# Patient Record
Sex: FEMALE | URBAN - METROPOLITAN AREA
[De-identification: names, ages, dates, MRNs, and addresses within clinical notes are randomized per-mention and may not be internally consistent; named-entity substitution may affect disease eponyms.]

---

## 2019-06-12 ENCOUNTER — APPOINTMENT (RX ONLY)
Dept: URBAN - METROPOLITAN AREA CLINIC 12 | Facility: CLINIC | Age: 54
Setting detail: DERMATOLOGY
End: 2019-06-12

## 2019-06-12 DIAGNOSIS — Z41.1 ENCOUNTER FOR COSMETIC SURGERY: ICD-10-CM

## 2019-06-12 PROCEDURE — ? CONSULTATION - AGING FACE

## 2019-06-12 PROCEDURE — ? PATIENT SPECIFIC COUNSELING

## 2019-06-12 NOTE — PROCEDURE: CONSULTATION - AGING FACE
Detail Level: Detailed
Send Procedure Quote As Charge: No
Consultation Charge $ (Use Numbers Only, No Text Please.): 100

## 2019-06-12 NOTE — PROCEDURE: PATIENT SPECIFIC COUNSELING
Detail Level: Simple
Other (Free Text): Patient presents for facelift consultation and didn’t want to take pictures today. Patient states she does not have a dry eye problem. Patient was open to Dr. Oseguera giving his opinion on options she thought was best for her. \\n\\nPsiobhan has hooded/crowded brows with a tired appearance and Dr. Oseguera recommended a temporal brow lift, upper blepharoplasty and periorbital chemical peel. For her lower face laxity Dr. Oseguera recommended a lower face and neck lift, along with a sub mental incision to tighten the neck muscle. Discussed where the scars will be and that typically only the  might notice. A lower neck and face lift tends to last 8-10 years and is the most aggressive option she has. \\nFor recovery she was advised that it will take 10-12 days before she can out and about without people wondering what she had done. Advised she would need to spend at least one night in the hospital and she would need a responsible adult at home to help her around the house. \\n\\nPsiobhan smokes ecigs and goes through 1 pod every 2-3 days. Advised that of Dr. Oseguera is going to do surgery on her he wants her to stop 2 weeks before and 2 weeks after.\\n\\nA step down from the neck and face lift would be to do lower face Ulthera. Advised that it tends to work 70% of the time and the results are not guaranteed. If the patient is okay with surgery, the more aggressive option is recommended.\\n\\nQuote given.

## 2019-06-27 ENCOUNTER — APPOINTMENT (RX ONLY)
Dept: URBAN - METROPOLITAN AREA CLINIC 12 | Facility: CLINIC | Age: 54
Setting detail: DERMATOLOGY
End: 2019-06-27

## 2019-06-27 DIAGNOSIS — L90.8 OTHER ATROPHIC DISORDERS OF SKIN: ICD-10-CM

## 2019-06-27 PROCEDURE — ? ULTHERAPY

## 2019-06-27 ASSESSMENT — LOCATION DETAILED DESCRIPTION DERM
LOCATION DETAILED: LEFT MEDIAL MALAR CHEEK
LOCATION DETAILED: RIGHT INFERIOR MEDIAL MALAR CHEEK
LOCATION DETAILED: LEFT SUBMANDIBULAR AREA
LOCATION DETAILED: LEFT CENTRAL BUCCAL CHEEK
LOCATION DETAILED: SUBMENTAL CHIN
LOCATION DETAILED: LEFT INFERIOR TEMPLE
LOCATION DETAILED: LEFT SUPERIOR ANTERIOR NECK
LOCATION DETAILED: LEFT INFERIOR LATERAL MALAR CHEEK
LOCATION DETAILED: RIGHT INFERIOR FOREHEAD
LOCATION DETAILED: RIGHT CENTRAL MANDIBULAR CHEEK
LOCATION DETAILED: RIGHT SUPERIOR LATERAL NECK
LOCATION DETAILED: RIGHT CHIN
LOCATION DETAILED: RIGHT SUBMANDIBULAR AREA
LOCATION DETAILED: LEFT SUPERIOR LATERAL NECK
LOCATION DETAILED: LEFT SUPERIOR LATERAL MALAR CHEEK
LOCATION DETAILED: RIGHT INFERIOR TEMPLE
LOCATION DETAILED: LEFT INFERIOR FOREHEAD
LOCATION DETAILED: RIGHT SUPERIOR LATERAL MALAR CHEEK
LOCATION DETAILED: RIGHT SUPERIOR ANTERIOR NECK
LOCATION DETAILED: LEFT FOREHEAD
LOCATION DETAILED: RIGHT CENTRAL MALAR CHEEK

## 2019-06-27 ASSESSMENT — LOCATION SIMPLE DESCRIPTION DERM
LOCATION SIMPLE: LEFT TEMPLE
LOCATION SIMPLE: LEFT FOREHEAD
LOCATION SIMPLE: RIGHT SUBMANDIBULAR AREA
LOCATION SIMPLE: LEFT CHEEK
LOCATION SIMPLE: RIGHT CHEEK
LOCATION SIMPLE: RIGHT ANTERIOR NECK
LOCATION SIMPLE: CHIN
LOCATION SIMPLE: LEFT SUBMANDIBULAR AREA
LOCATION SIMPLE: SUBMENTAL CHIN
LOCATION SIMPLE: RIGHT FOREHEAD
LOCATION SIMPLE: RIGHT TEMPLE
LOCATION SIMPLE: LEFT ANTERIOR NECK

## 2019-06-27 ASSESSMENT — LOCATION ZONE DERM
LOCATION ZONE: NECK
LOCATION ZONE: FACE

## 2019-06-27 NOTE — PROCEDURE: ULTHERAPY
Patient Reported Discomfort: 0
Post-Procedures Photographs: No
Consent: Written consent obtained, risks reviewed including but not limited to crusting, scabbing, blistering, scarring, darker or lighter pigmentary change, and/or incomplete improvement.
Distraction Techniques: Buzzy Bee
Anesthesia Volume In Cc: 6
Pre-Procedures Photographs: Yes
Detail Level: Simple
Anesthesia Type: 1% lidocaine with epinephrine
Price $ (Use Numbers Only, No Text Please.): 5942
Total Lines (Use Numbers Only, No Text Please.): 441
Treatment Number: 1
Postcare Instructions: I reviewed with the patient in detail post-care instructions. Patient should stay away from the sun and wear sun protection until treated areas are fully healed.

## 2019-09-10 ENCOUNTER — APPOINTMENT (RX ONLY)
Dept: URBAN - METROPOLITAN AREA CLINIC 12 | Facility: CLINIC | Age: 54
Setting detail: DERMATOLOGY
End: 2019-09-10

## 2019-09-10 DIAGNOSIS — Z41.9 ENCOUNTER FOR PROCEDURE FOR PURPOSES OTHER THAN REMEDYING HEALTH STATE, UNSPECIFIED: ICD-10-CM

## 2019-09-10 DIAGNOSIS — L90.8 OTHER ATROPHIC DISORDERS OF SKIN: ICD-10-CM

## 2019-09-10 PROCEDURE — ? PHOTOS OBTAINED

## 2019-09-10 PROCEDURE — ? FILLERS

## 2019-09-10 PROCEDURE — ? PATIENT SPECIFIC COUNSELING

## 2019-09-10 ASSESSMENT — LOCATION SIMPLE DESCRIPTION DERM
LOCATION SIMPLE: LEFT LIP
LOCATION SIMPLE: NOSE
LOCATION SIMPLE: CHIN
LOCATION SIMPLE: LEFT FOREHEAD
LOCATION SIMPLE: LEFT CHEEK
LOCATION SIMPLE: RIGHT CHEEK

## 2019-09-10 ASSESSMENT — LOCATION ZONE DERM
LOCATION ZONE: LIP
LOCATION ZONE: NOSE
LOCATION ZONE: FACE

## 2019-09-10 ASSESSMENT — LOCATION DETAILED DESCRIPTION DERM
LOCATION DETAILED: RIGHT INFERIOR CENTRAL MALAR CHEEK
LOCATION DETAILED: LEFT INFERIOR FOREHEAD
LOCATION DETAILED: NASAL TIP
LOCATION DETAILED: LEFT LOWER CUTANEOUS LIP
LOCATION DETAILED: LEFT CENTRAL MALAR CHEEK
LOCATION DETAILED: LEFT CHIN
LOCATION DETAILED: RIGHT CENTRAL MALAR CHEEK
LOCATION DETAILED: LEFT INFERIOR CENTRAL MALAR CHEEK
LOCATION DETAILED: LEFT INFERIOR MEDIAL FOREHEAD

## 2019-09-10 NOTE — PROCEDURE: FILLERS
Cheeks Filler Volume In Cc: 0
Show Price In Note?: no
Price $ (Numeric Only, No Text Please.): 968
Jawline Filler Volume In Cc: 1
Consent: Written consent obtained. Risks include but not limited to bruising, beading, irregular texture, ulceration, infection, allergic reaction, scar formation, incomplete augmentation, temporary nature, procedural pain.
Filler: Christelle Wakefield ([place]-L)
Lot #: [id number]
Detail Level: Detailed
Map Statment: See attached map for complete details
Expiration Date (Month Year): 12/21
Topical Anesthesia?: yes
Administered By (Optional): Zayra huber RN
Price $ (Numeric Only, No Text Please.): 619
Filler: Belotero

## 2019-09-10 NOTE — PROCEDURE: PATIENT SPECIFIC COUNSELING
Other (Free Text): Patient is pleased with results from ulthera but would like to have seen more change with jowls. Patient states she would like to repeat Ulthera for lower face. \\nCalexandra Olsen RN was brought in to consult with patient in regards to filler on the lateral face. Patient will see Zayra for treatment at today's visit. \\n\\nDiscussed returning to clinic to treat with Ulthera in 1-3 months. Patient will call to schedule. Ulthera pricing sheet provided to patient.
Detail Level: Zone

## 2019-09-10 NOTE — PROCEDURE: PHOTOS OBTAINED
Follow-Up Increment: 6
Photographed Locations: Photos were obtained today.
Follow-Up For Additional Photos?: no
Detail Level: Simple
Follow-Up Interval: week

## 2025-04-22 ENCOUNTER — APPOINTMENT (OUTPATIENT)
Dept: URBAN - METROPOLITAN AREA CLINIC 12 | Facility: CLINIC | Age: 60
Setting detail: DERMATOLOGY
End: 2025-04-22

## 2025-04-22 DIAGNOSIS — Z41.1 ENCOUNTER FOR COSMETIC SURGERY: ICD-10-CM

## 2025-04-22 PROCEDURE — ? CONSULTATION - RHINOPLASTY

## 2025-04-22 PROCEDURE — ? PATIENT SPECIFIC COUNSELING

## 2025-04-22 ASSESSMENT — LOCATION SIMPLE DESCRIPTION DERM
LOCATION SIMPLE: NOSE
LOCATION SIMPLE: NOSE

## 2025-04-22 ASSESSMENT — LOCATION ZONE DERM
LOCATION ZONE: NOSE
LOCATION ZONE: NOSE

## 2025-04-22 ASSESSMENT — LOCATION DETAILED DESCRIPTION DERM
LOCATION DETAILED: NASAL TIP
LOCATION DETAILED: NASAL SUPRATIP